# Patient Record
Sex: FEMALE | Race: WHITE | Employment: FULL TIME | URBAN - METROPOLITAN AREA
[De-identification: names, ages, dates, MRNs, and addresses within clinical notes are randomized per-mention and may not be internally consistent; named-entity substitution may affect disease eponyms.]

---

## 2020-10-13 ENCOUNTER — APPOINTMENT (OUTPATIENT)
Dept: CT IMAGING | Facility: CLINIC | Age: 50
End: 2020-10-13
Attending: EMERGENCY MEDICINE
Payer: COMMERCIAL

## 2020-10-13 ENCOUNTER — HOSPITAL ENCOUNTER (EMERGENCY)
Facility: CLINIC | Age: 50
Discharge: HOME | End: 2020-10-13
Attending: EMERGENCY MEDICINE | Admitting: EMERGENCY MEDICINE
Payer: COMMERCIAL

## 2020-10-13 VITALS
HEART RATE: 83 BPM | SYSTOLIC BLOOD PRESSURE: 145 MMHG | DIASTOLIC BLOOD PRESSURE: 89 MMHG | WEIGHT: 240 LBS | OXYGEN SATURATION: 98 % | RESPIRATION RATE: 16 BRPM | BODY MASS INDEX: 43.9 KG/M2

## 2020-10-13 DIAGNOSIS — G44.219 EPISODIC TENSION-TYPE HEADACHE, NOT INTRACTABLE: ICD-10-CM

## 2020-10-13 LAB
ABO + RH BLD: NORMAL
ABO + RH BLD: NORMAL
ANION GAP SERPL CALCULATED.3IONS-SCNC: 9 MMOL/L (ref 3–14)
BASOPHILS # BLD AUTO: 0 10E9/L (ref 0–0.2)
BASOPHILS NFR BLD AUTO: 0.5 %
BLD GP AB SCN SERPL QL: NORMAL
BLOOD BANK CMNT PATIENT-IMP: NORMAL
BUN SERPL-MCNC: 11 MG/DL (ref 7–30)
CALCIUM SERPL-MCNC: 8.8 MG/DL (ref 8.5–10.1)
CHLORIDE SERPL-SCNC: 107 MMOL/L (ref 94–109)
CO2 SERPL-SCNC: 24 MMOL/L (ref 20–32)
CREAT SERPL-MCNC: 0.87 MG/DL (ref 0.52–1.04)
DIFFERENTIAL METHOD BLD: ABNORMAL
EOSINOPHIL # BLD AUTO: 0.1 10E9/L (ref 0–0.7)
EOSINOPHIL NFR BLD AUTO: 0.8 %
ERYTHROCYTE [DISTWIDTH] IN BLOOD BY AUTOMATED COUNT: 13.6 % (ref 10–15)
GFR SERPL CREATININE-BSD FRML MDRD: 77 ML/MIN/{1.73_M2}
GLUCOSE SERPL-MCNC: 103 MG/DL (ref 70–99)
HCT VFR BLD AUTO: 48.9 % (ref 35–47)
HGB BLD-MCNC: 15.8 G/DL (ref 11.7–15.7)
IMM GRANULOCYTES # BLD: 0 10E9/L (ref 0–0.4)
IMM GRANULOCYTES NFR BLD: 0.5 %
INR PPP: 0.85 (ref 0.86–1.14)
LYMPHOCYTES # BLD AUTO: 1.8 10E9/L (ref 0.8–5.3)
LYMPHOCYTES NFR BLD AUTO: 28.9 %
MCH RBC QN AUTO: 31.7 PG (ref 26.5–33)
MCHC RBC AUTO-ENTMCNC: 32.3 G/DL (ref 31.5–36.5)
MCV RBC AUTO: 98 FL (ref 78–100)
MONOCYTES # BLD AUTO: 0.4 10E9/L (ref 0–1.3)
MONOCYTES NFR BLD AUTO: 6.9 %
NEUTROPHILS # BLD AUTO: 3.8 10E9/L (ref 1.6–8.3)
NEUTROPHILS NFR BLD AUTO: 62.4 %
NRBC # BLD AUTO: 0 10*3/UL
NRBC BLD AUTO-RTO: 0 /100
PLATELET # BLD AUTO: 182 10E9/L (ref 150–450)
POTASSIUM SERPL-SCNC: 3.9 MMOL/L (ref 3.4–5.3)
RBC # BLD AUTO: 4.98 10E12/L (ref 3.8–5.2)
SODIUM SERPL-SCNC: 140 MMOL/L (ref 133–144)
SPECIMEN EXP DATE BLD: NORMAL
WBC # BLD AUTO: 6.1 10E9/L (ref 4–11)

## 2020-10-13 PROCEDURE — 85025 COMPLETE CBC W/AUTO DIFF WBC: CPT | Performed by: EMERGENCY MEDICINE

## 2020-10-13 PROCEDURE — 250N000011 HC RX IP 250 OP 636: Performed by: EMERGENCY MEDICINE

## 2020-10-13 PROCEDURE — 86850 RBC ANTIBODY SCREEN: CPT | Performed by: EMERGENCY MEDICINE

## 2020-10-13 PROCEDURE — 96375 TX/PRO/DX INJ NEW DRUG ADDON: CPT

## 2020-10-13 PROCEDURE — 80048 BASIC METABOLIC PNL TOTAL CA: CPT | Performed by: EMERGENCY MEDICINE

## 2020-10-13 PROCEDURE — 86900 BLOOD TYPING SEROLOGIC ABO: CPT | Performed by: EMERGENCY MEDICINE

## 2020-10-13 PROCEDURE — 99285 EMERGENCY DEPT VISIT HI MDM: CPT | Mod: 25

## 2020-10-13 PROCEDURE — 85610 PROTHROMBIN TIME: CPT | Performed by: EMERGENCY MEDICINE

## 2020-10-13 PROCEDURE — 70498 CT ANGIOGRAPHY NECK: CPT

## 2020-10-13 PROCEDURE — 93005 ELECTROCARDIOGRAM TRACING: CPT

## 2020-10-13 PROCEDURE — 70450 CT HEAD/BRAIN W/O DYE: CPT

## 2020-10-13 PROCEDURE — 96374 THER/PROPH/DIAG INJ IV PUSH: CPT | Mod: 59

## 2020-10-13 PROCEDURE — 86901 BLOOD TYPING SEROLOGIC RH(D): CPT | Performed by: EMERGENCY MEDICINE

## 2020-10-13 RX ORDER — IOPAMIDOL 755 MG/ML
500 INJECTION, SOLUTION INTRAVASCULAR ONCE
Status: COMPLETED | OUTPATIENT
Start: 2020-10-13 | End: 2020-10-13

## 2020-10-13 RX ORDER — KETOROLAC TROMETHAMINE 10 MG/1
10 TABLET, FILM COATED ORAL EVERY 6 HOURS PRN
Qty: 15 TABLET | Refills: 0 | Status: SHIPPED | OUTPATIENT
Start: 2020-10-13

## 2020-10-13 RX ORDER — KETOROLAC TROMETHAMINE 15 MG/ML
15 INJECTION, SOLUTION INTRAMUSCULAR; INTRAVENOUS ONCE
Status: COMPLETED | OUTPATIENT
Start: 2020-10-13 | End: 2020-10-13

## 2020-10-13 RX ORDER — LORAZEPAM 2 MG/ML
0.5 INJECTION INTRAMUSCULAR ONCE
Status: COMPLETED | OUTPATIENT
Start: 2020-10-13 | End: 2020-10-13

## 2020-10-13 RX ADMIN — IOPAMIDOL 70 ML: 755 INJECTION, SOLUTION INTRAVENOUS at 17:07

## 2020-10-13 RX ADMIN — LORAZEPAM 0.5 MG: 2 INJECTION INTRAMUSCULAR; INTRAVENOUS at 17:15

## 2020-10-13 RX ADMIN — KETOROLAC TROMETHAMINE 15 MG: 15 INJECTION, SOLUTION INTRAMUSCULAR; INTRAVENOUS at 17:30

## 2020-10-13 ASSESSMENT — ENCOUNTER SYMPTOMS
NAUSEA: 1
NUMBNESS: 1

## 2020-10-13 NOTE — ED PROVIDER NOTES
"  History     Chief Complaint:  Headache    HPI   Moriah Hudson is a 50 year old female who presents with a headache. The patient reports having a mild headache part of yesterday and today, and then at 1554 she had a sudden onset headache in the back of her head that came on like \"a flip of a switch\" that she rates as 10/10 pain. She tried to leave work but fel nauseous so she went back to her desk and tried to use an ice pack to help the pain. She states she is not currently on any daily medications.    Allergies:  Hemopathic drugs    Medications:    Wellbutrin    Past Medical History:    Genital herpes  Vitamin D deficiency  Dysfunctional alcohol use  Abnormal thyroid screen  Iron deficiency anemia  Depression     Past Surgical History:    Gastric bypass  Cholecystectomy  C section  Tonsil and adenoidectomy    Family History:    Father: Diabetes    Social History:  The patient was unaccompanied to the ED.  Smoking Status: Never Smoker  Smokeless Tobacco: Never Used  Alcohol Use: Positive  Drug Use: Negative  Marital Status:      Review of Systems   Gastrointestinal: Positive for nausea.   Neurological: Positive for numbness.   All other systems reviewed and are negative.      Physical Exam     Patient Vitals for the past 24 hrs:   BP Pulse Resp SpO2 Weight   10/13/20 1836 (!) 145/89 83 -- 98 % --   10/13/20 1834 (!) 153/101 83 -- 99 % --   10/13/20 1730 (!) 169/106 -- -- 99 % --   10/13/20 1720 (!) 166/97 -- -- -- --   10/13/20 1700 -- -- -- 100 % --   10/13/20 1637 (!) 190/151 -- -- -- --   10/13/20 1634 (!) 199/140 89 16 99 % 108.9 kg (240 lb)       Physical Exam  Vitals signs reviewed.   Constitutional:       Appearance: She is obese.   HENT:      Right Ear: Tympanic membrane normal.      Left Ear: Tympanic membrane normal.      Nose: Nose normal.      Mouth/Throat:      Mouth: Mucous membranes are moist.   Cardiovascular:      Rate and Rhythm: Normal rate.   Pulmonary:      Effort: Pulmonary effort " is normal.   Musculoskeletal: Normal range of motion.   Skin:     General: Skin is warm.      Capillary Refill: Capillary refill takes less than 2 seconds.   Neurological:      General: No focal deficit present.      Mental Status: She is alert and oriented to person, place, and time.   Psychiatric:         Mood and Affect: Mood normal.         Emergency Department Course     ECG:  ECG taken at 1652, ECG read at 1655  Normal sinus rhythm  Normal ECG  Rate 83 bpm. SC interval 146 ms. QRS duration 84 ms. QT/QTc 370/434 ms. P-R-T axes 27 14 30.    Imaging:  Radiology findings were communicated with the patient who voiced understanding of the findings.    CTA Head Neck with Contrast  HEAD CT:  1.  Normal head CT.  Reading per radiology    HEAD CTA:   1.  Normal CTA [place] [facility] [place].  NECK CTA:  1.  Normal neck CTA.  Reading per radiology    Head CT w/o contrast  HEAD CT:  1.  Normal head CT.  HEAD CTA:   1.  Normal CTA [place] [facility] [place].  NECK CTA:  1.  Normal neck CTA.  Reading per radiology     Laboratory:  Laboratory findings were communicated with the patient who voiced understanding of the findings.    CBC: WBC 6.1, HGB 15.8 (H),   BMP: Glucose 103 (H) o/w WNL (Creatinine 0.87)    INR: 0.85 (L)    ABO/Rh type and screen ABO: O, Antibody Neg    Interventions:  1730 Toradol 15mg IV    Emergency Department Course:    Past medical records, nursing notes, and vitals reviewed.  1654: I performed an exam of the patient and obtained history, as documented above.     IV was inserted and blood was drawn for laboratory testing, results above.    The patient was sent for a CT and CTA while in the emergency department, findings above    1857: I rechecked and updated the patient.     I personally reviewed the laboratory and imaging results with the Patient and answered all related questions prior to discharge.     Findings and plan explained to the Patient. Patient discharged home with instructions regarding supportive  care, medications, and reasons to return. The importance of close follow-up was reviewed.     Impression & Plan      Medical Decision Making:  Moriah Hudson is a 50 year old female who presents to the emergency department today for evaluation of sudden onset headache.  Patient is a 50-year-old female who is morbidly obese at 240 pounds.  Patient describes a sudden onset headache that occurred while at work.  Patient did have a headache a course of the day but then suddenly changed.  Based on the sudden nature headache recommend CT and CT angiogram to rule out subarachnoid hemorrhage.  This is performed emergently due to the sudden nature to her headache but fortunately is negative.  Patient's laboratory work is also normal patient's initial blood pressure was 199/140 however small cuff was being used on the arm and and in inappropriate position repeat blood pressures improved without intervention to 145/89.  Patient headache improved with Toradol and Ativan suspect likely tension headache versus cluster headache unsure why the sudden change no clinical concern for CVT patient symptoms resolved with treatment and reassurance with negative imaging and patient was discharged in stable condition.  No clinical concern for meningitis at this time.  Due to accuracy of CT and CT angiography for aneurysm.  Did not feel that LP was required to further assess this sudden headache as patient symptoms resolved and imaging are negative.    Diagnosis:    ICD-10-CM    1. Episodic tension-type headache, not intractable  G44.219        Disposition:   The patient is discharged to home.    Discharge Medications:  New Prescriptions    KETOROLAC (TORADOL) 10 MG TABLET    Take 1 tablet (10 mg) by mouth every 6 hours as needed for moderate pain       Scribe Disclosure:  GORAN, Dominic Cutler, am serving as a scribe at 4:56 PM on 10/13/2020 to document services personally performed by Jesse Oneil MD based on my observations and  the provider's statements to me.  [facility] EMERGENCY DEPT       Jesse Oneil MD  10/14/20 8167

## 2020-10-13 NOTE — ED TRIAGE NOTES
Pt here with c/o sudden onset occipital/temporal HA 1554. No visual changes. C/o nausea, no vomiting. No hx migraines. No dizziness, slurred speech, facial droop, unsteady gait or arm drift. ABC intact.

## 2020-10-13 NOTE — DISCHARGE INSTRUCTIONS
Due to the clinical story of sudden headache we have performed extensive testings for head pain including CT and CT angiogram.  These are normal.  Your blood pressure was high when you arrived but this is improved with time.  We have not found a structure of the brain cause for headache.  Okay to continue Toradol or anti-inflammatory for headache and please follow-up with your regular doctor for reassessment if headaches continue return with fever or worsening condition.

## 2020-10-14 LAB — INTERPRETATION ECG - MUSE: NORMAL

## 2022-06-26 ENCOUNTER — APPOINTMENT (OUTPATIENT)
Dept: GENERAL RADIOLOGY | Facility: CLINIC | Age: 52
End: 2022-06-26
Attending: EMERGENCY MEDICINE
Payer: COMMERCIAL

## 2022-06-26 ENCOUNTER — HOSPITAL ENCOUNTER (EMERGENCY)
Facility: CLINIC | Age: 52
Discharge: HOME | End: 2022-06-26
Attending: EMERGENCY MEDICINE | Admitting: EMERGENCY MEDICINE
Payer: COMMERCIAL

## 2022-06-26 VITALS
OXYGEN SATURATION: 97 % | RESPIRATION RATE: 18 BRPM | DIASTOLIC BLOOD PRESSURE: 87 MMHG | TEMPERATURE: 97.3 F | HEART RATE: 77 BPM | SYSTOLIC BLOOD PRESSURE: 137 MMHG

## 2022-06-26 DIAGNOSIS — S20.211A CHEST WALL CONTUSION, RIGHT, INITIAL ENCOUNTER: ICD-10-CM

## 2022-06-26 DIAGNOSIS — S10.91XA ABRASION OF NECK, INITIAL ENCOUNTER: ICD-10-CM

## 2022-06-26 DIAGNOSIS — S80.02XA CONTUSION OF LEFT KNEE, INITIAL ENCOUNTER: ICD-10-CM

## 2022-06-26 PROCEDURE — 99285 EMERGENCY DEPT VISIT HI MDM: CPT | Mod: 25

## 2022-06-26 PROCEDURE — 73562 X-RAY EXAM OF KNEE 3: CPT | Mod: LT

## 2022-06-26 PROCEDURE — 71046 X-RAY EXAM CHEST 2 VIEWS: CPT

## 2022-06-26 PROCEDURE — 93005 ELECTROCARDIOGRAM TRACING: CPT

## 2022-06-26 ASSESSMENT — ENCOUNTER SYMPTOMS
ARTHRALGIAS: 1
TREMORS: 0
NECK PAIN: 0
ABDOMINAL PAIN: 0
HEADACHES: 0
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
## 2022-06-26 NOTE — ED NOTES
Patient discharged home with sister. Vital signs stable at discharge. Education provided regarding avs reviewed. Pt verbalized understanding. All questions answered.

## 2022-06-27 LAB
ATRIAL RATE - MUSE: 75 BPM
DIASTOLIC BLOOD PRESSURE - MUSE: NORMAL MMHG
INTERPRETATION ECG - MUSE: NORMAL
P AXIS - MUSE: 22 DEGREES
PR INTERVAL - MUSE: 136 MS
QRS DURATION - MUSE: 82 MS
QT - MUSE: 382 MS
QTC - MUSE: 426 MS
R AXIS - MUSE: 22 DEGREES
SYSTOLIC BLOOD PRESSURE - MUSE: NORMAL MMHG
T AXIS - MUSE: 24 DEGREES
VENTRICULAR RATE- MUSE: 75 BPM